# Patient Record
(demographics unavailable — no encounter records)

---

## 2025-02-27 NOTE — ASSESSMENT
[FreeTextEntry1] : 26-year-old female, fibrocystic breasts, breast pain, family history of ovarian cancer   #Fibrocystic breast Reviewed with the patient clinical breast exam findings of fibrocystic breast tissue noted in the UOQ bilaterally, left > right with slight prominence of tissue in the region of her palpable concern left 2:00 2-3cmFN. Discussed there is no dominant mass on physical exam in this region; however, the breast US she completed in May 2024 did not denote this region as the area of concern. Recommend the patient proceed with a targeted left breast US now for further evaluation. Discussed if results return benign, she is to follow up in 6 months for repeat physical exam.   #Breast pain Discussed etiologies of breast pain including hormonal changes, benign entities such as cysts, and musculoskeletal issues.  Discussed with the patient and that the breast pain is likely due to her fibrocystic breast tissue. Recommended NSAIDs if the pain is intolerable, or the alternating of warm/cold packs, as well as wearing a supportive sports bra around-the-clock. Discussed option of a trial of evening primrose oil.    #Family history of ovarian cancer Reviewed with the patient her family history which is significant for her mother and maternal grandmother diagnosed with ovarian cancer.  The patient underwent full panel genetic testing in 2023 with negative results.

## 2025-02-27 NOTE — PAST MEDICAL HISTORY
[Menstruating] : The patient is menstruating [Menarche Age ____] : age at menarche was [unfilled] [Total Preg ___] : G[unfilled] [History of Hormone Replacement Treatment] : has no history of hormone replacement treatment [FreeTextEntry7] : None

## 2025-02-27 NOTE — DATA REVIEWED
[FreeTextEntry1] : 5/17/2024 (R) bilateral ultrasound: No evidence of malignancy. There are benign cysts noted in the left breast at 12:00 and 1:00, and in the right breast at 10:00 and 11:00. No suspicious findings in the area of palpable concern at 2:00 6 cm of the nipple in the left breast. Recommend clinical correlation. BI-RADS 2  9/5/24 (R) left breast US: Targeted left breast ultrasound was performed in the area of the patient's palpable concern at 2 o'clock 2 cm from the nipple (of note, was labeled at 2 o'clock 6 cm from the nipple in May 2024). There is no suspicious abnormality or mass identified in the area the patient's palpable concern. No evidence of skin thickening or parenchymal edema. IMPRESSION: No ultrasonographic evidence of malignancy. FOLLOW-UP: Clinical correlation and assessment. ASSESSMENT: BI-RADS Category 1: Negative.

## 2025-02-27 NOTE — HISTORY OF PRESENT ILLNESS
[FreeTextEntry1] : 27-year-old female, presents for follow up clinical breast exam for evaluation of a palpable complaint in the left breast at 2:00 2-3 cmFN. Patient underwent ultrasound evaluation in May 2024, which noted benign cysts bilaterally, but without sonographic findings at the area of palpable concern.  Of note, the area of palpable concern was denoted on patient's ultrasound from May 2024 as left breast 2:00 6 cm FN; however, the patient noted that the lump she can feel is in the 2:00 region 2 to 3 cm FN.  The patient states that she noticed the lump in the left breast in May 2024.  She states that she also experienced onset of breast pain starting in May 2024; however, it resolved after completion of the ultrasound.  Patient underwent a repeat targeted left breast US on 9/5/2024, no suspicious abnormality was identified in the area of patient's palpable concern, the patient was informed that the area of concern on clinical exam was consistent with normal fibroglandular tissue.  The patient states that the left breast pain returned last week, states that she did lift heavy luggage using her left upper extremity at that time. She is due to get her menstrual cycle next week. The patient states they do not have any skin changes/dimpling, no nipple discharge bilaterally. Denies breast surgery or breast biopsies.   PETE lifetime risk is 11.2%; patient has a family history of ovarian cancer in her mother who was diagnosed at age 51 (reportedly BRCA positive) and maternal grandmother diagnosed with ovarian cancer at age 50/51.  Patient denies family history of breast cancer. Patient underwent genetic testing via [facility] ([facility], panel testing) in 2024 and was negative for any pathogenic mutations

## 2025-02-27 NOTE — PHYSICAL EXAM
[Normocephalic] : normocephalic [No Supraclavicular Adenopathy] : no supraclavicular adenopathy [Examined in the supine and seated position] : examined in the supine and seated position [No dominant masses] : no dominant masses in right breast  [No dominant masses] : no dominant masses left breast [No Nipple Retraction] : no left nipple retraction [No Nipple Discharge] : no left nipple discharge [No Axillary Lymphadenopathy] : no left axillary lymphadenopathy [No Edema] : no edema [No Swelling] : no swelling [No Rashes] : no rashes [No Ulceration] : no ulceration [de-identified] : nodular glandular tissue UOQ, no dominant mass [de-identified] : nodular glandular tissue UOQ, with slight prominence in the left 2:00 2-3cmFN region, no dominant mass

## 2025-07-01 NOTE — PHYSICAL EXAM
[TextEntry] :     GEN: Well nourished, A&Ox3, NAD, Voice: strong FACE: Normaocephalic, symmetric, no masses or deformities, parotids symmetric without masses EYES: EOMI, PERRLA, No periorbital edema or erythema EARS: No external deformity, EAC patent, TMs intact NOSE: No external deformity, anterior exam with normal inferior turbinates OC/OP: Oral cavity without masses or lesions, OP symmetric. Tonsils: symmetric Dentition: wnl NECK: Supple, trachea midline, full ROM, small shotty LAD in level II without pathologic LAD, thyroid without palpable nodules, SMGs symmetric NEURO: Cranial nerves grossly intact RESP: Easy work of breathing, symmetric chest rise PSYCH: Normal affect

## 2025-07-01 NOTE — ASSESSMENT
[FreeTextEntry1] : 27F with temp 100 deg today, weight loss 4lbs in a week and some cervical LAD on US - Her cervical LN appear smaller than seen on her US at [facility] - Unclear reason for high temperature as she is otherwise asymptomatic - Scope exam is normal - Follow up with PCP - RTC for any changes in her cervical LAD for repeat US   Lianne Malin MD

## 2025-07-01 NOTE — HISTORY OF PRESENT ILLNESS
[de-identified] : 27F with rapid weight loss and cervical lymphadenopathy.   She has a right sided lymph node that she can feel that has been stable for 5+ years. On US 6/19/25 there was a 2.2cm right sided lymph node.  She lost 4 lbs in the course of a week. No fevers, chills. No recent URI. She has a cavity on the left lower that she thinks may be related to the left sided lymph node seen on her US at [facility]. Today her temperature is 100deg F  TSH and T4 was reportedly normal.   PMH: None SH: Denies tobacco, social EtOH

## 2025-07-01 NOTE — DATA REVIEWED
[de-identified] : FINDINGS:  A 2.2 x 0.5 x 0.8 cm left level 3 lymph node is noted without a classic fatty hilus.  Bilateral normal size neck lymph nodes are noted.  IMPRESSION:  Left-sided mildly prominent neck lymph node without a classic fatty hilus as noted above. Further evaluation should be based on clinical grounds. Follow-up sonogram recommended in 2 months time for reevaluation  Thank you for the opportunity to participate in the care of this patient.

## 2025-07-01 NOTE — PROCEDURE
[FreeTextEntry3] : ULTRASOUND Indication: Cervical Lymphadenopathy and weight loss RIGHT NECK Level II <1cm LN Level III 1cm SMG normal  LEFT NECK Level II LN 1.73cm LN with normal hilum Level III with small non-pathologic subcentimeter LN  THYROID: no nodules, homogenous thyroid bilaterally Images saved to PACS  Fiberoptic Laryngoscopy ([id number]) Indication: Cervical Lymphadenopathy   Procedure performed: Fiberoptic Laryngeal Endoscopy - Diagnostic Patient was unable to cooperate with mirror. After informed verbal consent is obtained, the fiberoptic nasal endoscope is passed via the nasal cavity. Evaluated the nasopharynx, base of tongue, vallecula, hypopharynx, supraglottis and glottis.   Findings: Prominent BOT lymphoid tissue, slightly more prominent on the left. Adenoids symmetric.  Otherwise normal